# Patient Record
Sex: FEMALE
[De-identification: names, ages, dates, MRNs, and addresses within clinical notes are randomized per-mention and may not be internally consistent; named-entity substitution may affect disease eponyms.]

---

## 2017-08-27 ENCOUNTER — TRANSCRIPTION ENCOUNTER (OUTPATIENT)
Age: 37
End: 2017-08-27

## 2022-07-26 ENCOUNTER — NON-APPOINTMENT (OUTPATIENT)
Age: 42
End: 2022-07-26

## 2022-07-27 PROBLEM — Z00.00 ENCOUNTER FOR PREVENTIVE HEALTH EXAMINATION: Status: ACTIVE | Noted: 2022-07-27

## 2022-12-05 ENCOUNTER — NON-APPOINTMENT (OUTPATIENT)
Age: 42
End: 2022-12-05

## 2023-02-12 ENCOUNTER — NON-APPOINTMENT (OUTPATIENT)
Age: 43
End: 2023-02-12

## 2023-02-14 ENCOUNTER — APPOINTMENT (OUTPATIENT)
Dept: ORTHOPEDIC SURGERY | Facility: CLINIC | Age: 43
End: 2023-02-14
Payer: COMMERCIAL

## 2023-02-14 VITALS
BODY MASS INDEX: 30 KG/M2 | HEIGHT: 62 IN | SYSTOLIC BLOOD PRESSURE: 134 MMHG | DIASTOLIC BLOOD PRESSURE: 85 MMHG | WEIGHT: 163 LBS

## 2023-02-14 DIAGNOSIS — M25.561 PAIN IN RIGHT KNEE: ICD-10-CM

## 2023-02-14 PROCEDURE — 99203 OFFICE O/P NEW LOW 30 MIN: CPT

## 2023-02-14 PROCEDURE — 73564 X-RAY EXAM KNEE 4 OR MORE: CPT | Mod: RT

## 2023-02-14 PROCEDURE — 72170 X-RAY EXAM OF PELVIS: CPT

## 2023-02-14 RX ORDER — MELOXICAM 15 MG/1
15 TABLET ORAL DAILY
Qty: 14 | Refills: 1 | Status: ACTIVE | COMMUNITY
Start: 2023-02-14 | End: 1900-01-01

## 2023-02-20 PROBLEM — M25.561 RIGHT KNEE PAIN: Status: ACTIVE | Noted: 2023-02-14

## 2023-02-20 NOTE — REVIEW OF SYSTEMS
[Joint Pain] : joint pain [Negative] : Endocrine [Joint Stiffness] : no joint stiffness [Joint Swelling] : no joint swelling [Fever] : no fever [Chills] : no chills [FreeTextEntry5] : HTN

## 2023-02-20 NOTE — HISTORY OF PRESENT ILLNESS
[de-identified] : Ms. Small is a 42-year-old female presents to the office for evaluation of her right knee pain.  Patient went skiing on 2/11/2023 and felt that her boots were loose.  She felt some pain in her knee.  There is no noted fall or injury.  She continued to ski and the next day on 2/12/2023, she felt severe right knee pain.  Pain was worse with lateral movements of the knee.  Pain is located over the medial knee and feels like a pulling in the leg when going up and down stairs.  Pain is worse with stairs and walking.  She feels that the pain has been worsening.  Patient has tried Advil, with some relief.  No hip or groin pain.  No radiation of the pain.  No back pain.  Patient does have a history of knee crepitus and a possible history of patellar instability when playing soccer.

## 2023-02-20 NOTE — PHYSICAL EXAM
[de-identified] : Constitutional:  42 year old female, alert and oriented, cooperative, in no acute distress.\par \par HEENT \par NC/AT.  Appearance: symmetric\par \par Neck/Back\par Straight without deformity or instability.  Good ROM.\par \par Chest/Respiratory \par Respiratory effort: no intercostal retractions or use of accessory muscles. Nonlabored Breathing\par \par Skin \par On inspection, warm and dry without rashes or lesions.\par \par Mental Status: \par Judgment, insight: intact\par Orientation: oriented to time, place, and person\par \par Neurological:\par Sensory and Motor are grossly intact throughout\par \par Right Knee\par \par Inspection:\par     Skin intact, no rashes or lesions\par     No Effusion\par     Non-tender to palpation over tibial tubercle, patella, lateral joint line, and pes insertion.\par     Tenderness over the medial knee, near the mid coronal line and over the distal medial femoral condyle\par     Mild pain with patellar compression and translation\par \par Range of Motion:\par 	Extension - 0 degrees\par 	Flexion - 120 degrees\par 	Extensor lag: None\par \par Stability:\par      Demonstrates no Varus. Mild medial opening with Valgus stress\par      Negative Anterior or Posterior drawer.\par      Negative Lachman's\par      Positive McMurrays (Pain over the medial knee)\par \par Patella: stable, tracks well. \par \par Neurologic Exam\par     Motor intact including 5/5 Extensor Hallucis Longus, 5/5 Flexor Hallucis Longus, 5/5 Tibialis Anterior and 5/5 Gastrocnemius\par     Sensation Intact to Light Touch including Saphenous, Sural, Superficial Peroneal, Deep Peroneal, Tibial nerve distributions\par \par Vascular Exam\par     Foot is warm and well perfused with 2+ Dorsalis Pedis Pulse \par \par No pain with range of motion of the bilateral hips or left knee. No lumbar paraspinal muscle tenderness.  [de-identified] : XRay:  XRays of the Pelvis (1 View) taken in the office today and discussed with the patient. XRays demonstrate no obvious fracture or dislocation. There is no significant evidence of osteoarthritis or osteophyte formation. (my personal interpretation). \par \par XRay:  XRays of the Right Knee (4 Views) taken in the office today and discussed with the patient. XRays demonstrate no obvious fracture or dislocation. There is no significant evidence of osteoarthritis or osteophyte formation.  There is some lateral patellar tracking seen on the sunrise view.  (my personal interpretation).

## 2023-02-20 NOTE — DISCUSSION/SUMMARY
[de-identified] : Ms. Small is a 42-year-old female present to the office for evaluation of her right knee pain.  Patient has been experiencing right knee pain since skiing on 2/11/2023.  Patient reports difficulty with stairs and with walking.  X-rays showed no obvious fractures or dislocations, but did show some lateral patellar tracking on the sunrise view.  Examination showed some tenderness over the medial knee and patella, along with pain with Franco's test.  Discussed with patient examination and imaging findings.  Discussed with the patient the potential etiologies of her knee pain including, but not limited to, meniscal injury, ligamentous injury, cartilage injury, patellar maltracking, knee sprain, and knee strain.  Due to the mechanism of knee pain, worsening knee pain, and physical exam findings, discussed that it would be beneficial to undergo further evaluation with an MRI.  Right knee MRI was ordered.  Discussed the management of her knee pain at this time, including physical therapy and anti-inflammatories.  Patient was given a prescription for meloxicam 15 mg daily for 14 days.  Patient was given referral to physical therapy.  Patient will follow-up in 2 weeks, pending results of the MRI.  Patient understanding and in agreement with the plan.  All questions answered.\par \par Plan:\par -Right Knee MRI\par -Meloxicam 15 mg daily for 14 days\par -Physical therapy\par -Follow-up in 2 weeks, pending results of the MRI

## 2023-03-13 NOTE — HISTORY OF PRESENT ILLNESS
[de-identified] : Ms. Small is a 42-year-old female presents to the office for evaluation of her right knee pain.  Patient went skiing on 2/11/2023 and felt that her boots were loose.  She felt some pain in her knee.  There is no noted fall or injury.  She continued to ski and the next day on 2/12/2023, she felt severe right knee pain.  Pain was worse with lateral movements of the knee.  Pain is located over the medial knee and feels like a pulling in the leg when going up and down stairs.  Pain is worse with stairs and walking.  She feels that the pain has been worsening.  Patient has tried Advil, with some relief.  No hip or groin pain.  No radiation of the pain.  No back pain.  Patient does have a history of knee crepitus and a possible history of patellar instability when playing soccer.

## 2023-03-13 NOTE — PHYSICAL EXAM
[de-identified] : Constitutional:  42 year old female, alert and oriented, cooperative, in no acute distress.\par \par HEENT \par NC/AT.  Appearance: symmetric\par \par Neck/Back\par Straight without deformity or instability.  Good ROM.\par \par Chest/Respiratory \par Respiratory effort: no intercostal retractions or use of accessory muscles. Nonlabored Breathing\par \par Skin \par On inspection, warm and dry without rashes or lesions.\par \par Mental Status: \par Judgment, insight: intact\par Orientation: oriented to time, place, and person\par \par Neurological:\par Sensory and Motor are grossly intact throughout\par \par Right Knee\par \par Inspection:\par     Skin intact, no rashes or lesions\par     No Effusion\par     Non-tender to palpation over tibial tubercle, patella, lateral joint line, and pes insertion.\par     Tenderness over the medial knee, near the mid coronal line and over the distal medial femoral condyle\par     Mild pain with patellar compression and translation\par \par Range of Motion:\par 	Extension - 0 degrees\par 	Flexion - 120 degrees\par 	Extensor lag: None\par \par Stability:\par      Demonstrates no Varus. Mild medial opening with Valgus stress\par      Negative Anterior or Posterior drawer.\par      Negative Lachman's\par      Positive McMurrays (Pain over the medial knee)\par \par Patella: stable, tracks well. \par \par Neurologic Exam\par     Motor intact including 5/5 Extensor Hallucis Longus, 5/5 Flexor Hallucis Longus, 5/5 Tibialis Anterior and 5/5 Gastrocnemius\par     Sensation Intact to Light Touch including Saphenous, Sural, Superficial Peroneal, Deep Peroneal, Tibial nerve distributions\par \par Vascular Exam\par     Foot is warm and well perfused with 2+ Dorsalis Pedis Pulse \par \par No pain with range of motion of the bilateral hips or left knee. No lumbar paraspinal muscle tenderness.  [de-identified] : XRay:  XRays of the Pelvis (1 View) taken on 2/14/2023. XRays demonstrate no obvious fracture or dislocation. There is no significant evidence of osteoarthritis or osteophyte formation. (my personal interpretation). \par \par XRay:  XRays of the Right Knee (4 Views) taken on 2/14/2023. XRays demonstrate no obvious fracture or dislocation. There is no significant evidence of osteoarthritis or osteophyte formation.  There is some lateral patellar tracking seen on the sunrise view.  (my personal interpretation).

## 2023-03-13 NOTE — REVIEW OF SYSTEMS
[Joint Pain] : joint pain [Joint Stiffness] : no joint stiffness [Joint Swelling] : no joint swelling [Fever] : no fever [Chills] : no chills [Negative] : Endocrine [FreeTextEntry5] : HTN

## 2023-03-14 ENCOUNTER — APPOINTMENT (OUTPATIENT)
Dept: ORTHOPEDIC SURGERY | Facility: CLINIC | Age: 43
End: 2023-03-14

## 2023-12-15 ENCOUNTER — NON-APPOINTMENT (OUTPATIENT)
Age: 43
End: 2023-12-15

## 2024-06-13 ENCOUNTER — NON-APPOINTMENT (OUTPATIENT)
Age: 44
End: 2024-06-13

## 2024-11-20 ENCOUNTER — NON-APPOINTMENT (OUTPATIENT)
Age: 44
End: 2024-11-20